# Patient Record
Sex: MALE | Race: OTHER
[De-identification: names, ages, dates, MRNs, and addresses within clinical notes are randomized per-mention and may not be internally consistent; named-entity substitution may affect disease eponyms.]

---

## 2020-08-09 ENCOUNTER — HOSPITAL ENCOUNTER (EMERGENCY)
Dept: HOSPITAL 72 - EMR | Age: 40
Discharge: HOME | End: 2020-08-09
Payer: SELF-PAY

## 2020-08-09 VITALS — DIASTOLIC BLOOD PRESSURE: 80 MMHG | SYSTOLIC BLOOD PRESSURE: 138 MMHG

## 2020-08-09 VITALS — WEIGHT: 140 LBS | HEIGHT: 67 IN | BODY MASS INDEX: 21.97 KG/M2

## 2020-08-09 VITALS — SYSTOLIC BLOOD PRESSURE: 149 MMHG | DIASTOLIC BLOOD PRESSURE: 98 MMHG

## 2020-08-09 VITALS — DIASTOLIC BLOOD PRESSURE: 83 MMHG | SYSTOLIC BLOOD PRESSURE: 141 MMHG

## 2020-08-09 VITALS — SYSTOLIC BLOOD PRESSURE: 122 MMHG | DIASTOLIC BLOOD PRESSURE: 80 MMHG

## 2020-08-09 DIAGNOSIS — S00.511A: ICD-10-CM

## 2020-08-09 DIAGNOSIS — Z79.899: ICD-10-CM
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
## 2020-08-09 DIAGNOSIS — G40.909: Primary | ICD-10-CM

## 2020-08-09 LAB
ADD MANUAL DIFF: YES
ALBUMIN SERPL-MCNC: 4.1 G/DL (ref 3.4–5)
ALBUMIN/GLOB SERPL: 1 {RATIO} (ref 1–2.7)
ALP SERPL-CCNC: 83 U/L (ref 46–116)
ALT SERPL-CCNC: 62 U/L (ref 12–78)
ANION GAP SERPL CALC-SCNC: 9 MMOL/L (ref 5–15)
AST SERPL-CCNC: 183 U/L (ref 15–37)
BILIRUB SERPL-MCNC: 1 MG/DL (ref 0.2–1)
BUN SERPL-MCNC: 8 MG/DL (ref 7–18)
CALCIUM SERPL-MCNC: 8.9 MG/DL (ref 8.5–10.1)
CHLORIDE SERPL-SCNC: 92 MMOL/L (ref 98–107)
CK SERPL-CCNC: 263 U/L (ref 26–308)
CO2 SERPL-SCNC: 29 MMOL/L (ref 21–32)
CREAT SERPL-MCNC: 0.8 MG/DL (ref 0.55–1.3)
ERYTHROCYTE [DISTWIDTH] IN BLOOD BY AUTOMATED COUNT: 12.2 % (ref 11.6–14.8)
GLOBULIN SER-MCNC: 4 G/DL
HCT VFR BLD CALC: 39.4 % (ref 42–52)
HGB BLD-MCNC: 13.8 G/DL (ref 14.2–18)
MCV RBC AUTO: 96 FL (ref 80–99)
PLATELET # BLD: 60 K/UL (ref 150–450)
POTASSIUM SERPL-SCNC: 3.2 MMOL/L (ref 3.5–5.1)
RBC # BLD AUTO: 4.1 M/UL (ref 4.7–6.1)
SODIUM SERPL-SCNC: 130 MMOL/L (ref 136–145)
WBC # BLD AUTO: 5.3 K/UL (ref 4.8–10.8)

## 2020-08-09 PROCEDURE — 99284 EMERGENCY DEPT VISIT MOD MDM: CPT

## 2020-08-09 PROCEDURE — 96367 TX/PROPH/DG ADDL SEQ IV INF: CPT

## 2020-08-09 PROCEDURE — 83735 ASSAY OF MAGNESIUM: CPT

## 2020-08-09 PROCEDURE — 96375 TX/PRO/DX INJ NEW DRUG ADDON: CPT

## 2020-08-09 PROCEDURE — 80307 DRUG TEST PRSMV CHEM ANLYZR: CPT

## 2020-08-09 PROCEDURE — 70450 CT HEAD/BRAIN W/O DYE: CPT

## 2020-08-09 PROCEDURE — 96365 THER/PROPH/DIAG IV INF INIT: CPT

## 2020-08-09 PROCEDURE — 93005 ELECTROCARDIOGRAM TRACING: CPT

## 2020-08-09 PROCEDURE — 85007 BL SMEAR W/DIFF WBC COUNT: CPT

## 2020-08-09 PROCEDURE — 80053 COMPREHEN METABOLIC PANEL: CPT

## 2020-08-09 PROCEDURE — 85025 COMPLETE CBC W/AUTO DIFF WBC: CPT

## 2020-08-09 PROCEDURE — 82550 ASSAY OF CK (CPK): CPT

## 2020-08-09 PROCEDURE — 96361 HYDRATE IV INFUSION ADD-ON: CPT

## 2020-08-09 PROCEDURE — 36415 COLL VENOUS BLD VENIPUNCTURE: CPT

## 2020-08-09 NOTE — NUR
ED Nurse Note:



Patient reassesed, currently drowsy, unable to answer questions but able to 
follow commands. VSS, heart rate tachycardic.

## 2020-08-09 NOTE — EMERGENCY ROOM REPORT
History of Present Illness


General


Chief Complaint:  Seizure


Source:  Patient





Present Illness


HPI


This is a 40-year-old male with a history of seizure.  He presents with chief 

complaint of a seizure.  He is currently taking Dilantin but out for the last 

couple days.  He had a tonic-clonic seizure activity witnessed by family.  He 

has oral trauma.  Denies any nausea vomiting.  Denies any incontinence of bowel 

or urine.  Similar symptom in the past.  He does not drive because of his 

seizure.


Allergies:  


Coded Allergies:  


     No Known Allergies (Unverified , 8/9/20)





COVID-19 Screening


Contact w/high risk pt:  No


Experienced COVID-19 symptoms?:  No


COVID-19 Testing performed PTA:  No





Patient History


Past Medical History:  see triage record, old chart reviewed, seizures


Past Surgical History:  none


Pertinent Family History:  none


Social History:  Denies: smoking


Immunizations:  other


Reviewed Nursing Documentation:  PMH: Agreed; PSxH: Agreed





Review of Systems


Eye:  Denies: eye pain, blurred vision


ENT:  Denies: ear pain, nose congestion, throat swelling


Respiratory:  Denies: cough, shortness of breath


Cardiovascular:  Denies: chest pain, palpitations


Gastrointestinal:  Denies: abdominal pain, diarrhea, nausea, vomiting


Musculoskeletal:  Denies: back pain, joint pain


Skin:  Denies: rash


Neurological:  Denies: headache, numbness


Endocrine:  Denies: increased thirst, increased urine


Hematologic/Lymphatic:  Denies: easy bruising


All Other Systems:  negative except mentioned in HPI





Physical Exam





Vital Signs








  Date Time  Temp Pulse Resp B/P (MAP) Pulse Ox O2 Delivery O2 Flow Rate FiO2


 


8/9/20 03:15  110 18 170/98 (122) 98 Room Air  





Vitals with tachycardia


Sp02 EP Interpretation:  reviewed, normal


General Appearance:  well appearing, no apparent distress, alert


Head:  normocephalic, atraumatic


Eyes:  bilateral eye PERRL, bilateral eye EOMI


ENT:  hearing grossly normal, normal pharynx, other - Lower lip abrasion


Neck:  full range of motion, supple, no meningismus


Respiratory:  chest non-tender, lungs clear, normal breath sounds


Cardiovascular #1:  regular rate, rhythm, no murmur


Gastrointestinal:  normal bowel sounds, non tender, no mass, no organomegaly, 

no bruit, non-distended


Musculoskeletal:  back normal, normal range of motion, gait/station normal


Psychiatric:  mood/affect normal





Medical Decision Making


Diagnostic Impression:  


 Primary Impression:  


 Epileptic seizure, generalized


ER Course


This patient presents with seizure activity secondary to subtherapeutic level.  

Loaded with Dilantin here.  Will discharge home with Dilantin.





Last Vital Signs








  Date Time  Temp Pulse Resp B/P (MAP) Pulse Ox O2 Delivery O2 Flow Rate FiO2


 


8/9/20 03:15  110 18 170/98 (122) 98 Room Air  








Status:  improved


Disposition:  HOME, SELF-CARE


Condition:  Stable


Scripts


Phenytoin Sodium Extended* (DILANTIN*) 100 Mg Capsule


300 MG ORAL BEDTIME, #180 CAP


   Prov: Jose Goss MD         8/9/20


Patient Instructions:  Seizure, Adult





Additional Instructions:  


Take your seizure medication.  Follow-up with your doctor in 7 days.  Return if 

worse.











Jose Goss MD Aug 9, 2020 03:49

## 2020-08-09 NOTE — NUR
ED Nurse Note:



patient brought in by ambulance RA26 from home d/t witnessed seizure by family, 
patient aao x 3 upon arrival. Patient reports he has not had his seizure 
medication for 4 days d/t running out. Patient placed on cardiac monitor, 
seizure precautions implemented. Padded side rails in place. Patient stable 
during assessment. No acute distress noted during assessment.

## 2020-08-09 NOTE — NUR
ED Nurse Note:



Received hand-off from LUIS A Alvarez for continuity of care. Pt on bed, awake 
and alert on stable condition, breathing even and unlabored; pt on cardiac 
monitor, denies any pain nor discomfort as of now. Established another line on 
LT hand 22G, blood specimen sent, hydration infusing well.

## 2020-08-09 NOTE — DIAGNOSTIC IMAGING REPORT
EXAM:

  CT Head Without Intravenous Contrast

 

CLINICAL HISTORY:

  AMS

 

TECHNIQUE:

  Axial computed tomography images of the head/brain without intravenous 

contrast.  CTDI is 53.4 mGy and DLP is 1018.8 mGy-cm.  One or more of the 

following dose reduction techniques were used: automated exposure control,

 adjustment of the mA and/or kV according to patient size, use of 

iterative reconstruction technique.

 

COMPARISON:

  None.

 

FINDINGS:

  Brain:  No abnormal extra-axial collection.  No hemorrhage.

  Midline shift:  No midline shift or mass-effect.

  Ventricles:  There is prominence of the ventricular system, cortical 

sulci, basilar cisterns, compatible with age-related atrophy.

  Bones/joints:  Calvarium is unremarkable.  No acute fracture.

  Soft tissues:  Unremarkable.

  Sinuses:  Moderate to severe chronic ethmoid sinusitis.

  Mastoid air cells:  Mastoid air cells are well pneumatized.

 

IMPRESSION:     

1.  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

2.  No acute intracranial pathology is detected.

3.  If there is concern for etiology such as early acute lacunar infarcts,

 magnetic resonance imaging of the brain with diffusion-weighted 

sequences should be performed for follow-up.

## 2020-08-09 NOTE — NUR
ED Nurse Note:



Patient had witnessed general tonic clonic seizure activity lasting 30 seconds. 
Padded side rails in place, 6L O2 applied. Patient spontaneous return of 
consciousness, patient able to follow commands but unable to respond to 
questions.